# Patient Record
Sex: MALE | Race: WHITE | URBAN - METROPOLITAN AREA
[De-identification: names, ages, dates, MRNs, and addresses within clinical notes are randomized per-mention and may not be internally consistent; named-entity substitution may affect disease eponyms.]

---

## 2018-09-06 ENCOUNTER — RECORDS - HEALTHEAST (OUTPATIENT)
Dept: LAB | Facility: CLINIC | Age: 63
End: 2018-09-06

## 2018-09-06 LAB
ALBUMIN SERPL-MCNC: 4 G/DL (ref 3.5–5)
ALP SERPL-CCNC: 69 U/L (ref 45–120)
ALT SERPL W P-5'-P-CCNC: 61 U/L (ref 0–45)
ANION GAP SERPL CALCULATED.3IONS-SCNC: 8 MMOL/L (ref 5–18)
AST SERPL W P-5'-P-CCNC: 46 U/L (ref 0–40)
BILIRUB SERPL-MCNC: 1.1 MG/DL (ref 0–1)
BUN SERPL-MCNC: 13 MG/DL (ref 8–22)
CALCIUM SERPL-MCNC: 9.3 MG/DL (ref 8.5–10.5)
CHLORIDE BLD-SCNC: 107 MMOL/L (ref 98–107)
CHOLEST SERPL-MCNC: 201 MG/DL
CO2 SERPL-SCNC: 26 MMOL/L (ref 22–31)
CREAT SERPL-MCNC: 0.88 MG/DL (ref 0.7–1.3)
FASTING STATUS PATIENT QL REPORTED: ABNORMAL
GFR SERPL CREATININE-BSD FRML MDRD: >60 ML/MIN/1.73M2
GLUCOSE BLD-MCNC: 105 MG/DL (ref 70–125)
HDLC SERPL-MCNC: 31 MG/DL
LDLC SERPL CALC-MCNC: 120 MG/DL
POTASSIUM BLD-SCNC: 4.8 MMOL/L (ref 3.5–5)
PROT SERPL-MCNC: 7.1 G/DL (ref 6–8)
PSA SERPL-MCNC: 0.9 NG/ML (ref 0–4.5)
SODIUM SERPL-SCNC: 141 MMOL/L (ref 136–145)
TRIGL SERPL-MCNC: 249 MG/DL

## 2019-10-24 ENCOUNTER — RECORDS - HEALTHEAST (OUTPATIENT)
Dept: LAB | Facility: CLINIC | Age: 64
End: 2019-10-24

## 2019-10-24 LAB
ANION GAP SERPL CALCULATED.3IONS-SCNC: 8 MMOL/L (ref 5–18)
BUN SERPL-MCNC: 15 MG/DL (ref 8–22)
CALCIUM SERPL-MCNC: 9.4 MG/DL (ref 8.5–10.5)
CHLORIDE BLD-SCNC: 104 MMOL/L (ref 98–107)
CHOLEST SERPL-MCNC: 166 MG/DL
CO2 SERPL-SCNC: 27 MMOL/L (ref 22–31)
CREAT SERPL-MCNC: 1.11 MG/DL (ref 0.7–1.3)
FASTING STATUS PATIENT QL REPORTED: ABNORMAL
GFR SERPL CREATININE-BSD FRML MDRD: >60 ML/MIN/1.73M2
GLUCOSE BLD-MCNC: 203 MG/DL (ref 70–125)
HDLC SERPL-MCNC: 26 MG/DL
LDLC SERPL CALC-MCNC: 61 MG/DL
LDLC SERPL CALC-MCNC: ABNORMAL MG/DL
POTASSIUM BLD-SCNC: 4.7 MMOL/L (ref 3.5–5)
SODIUM SERPL-SCNC: 139 MMOL/L (ref 136–145)
TRIGL SERPL-MCNC: 644 MG/DL

## 2019-11-06 ENCOUNTER — RECORDS - HEALTHEAST (OUTPATIENT)
Dept: LAB | Facility: CLINIC | Age: 64
End: 2019-11-06

## 2019-11-06 LAB
CHOLEST SERPL-MCNC: 148 MG/DL
FASTING STATUS PATIENT QL REPORTED: YES
HDLC SERPL-MCNC: 26 MG/DL
LDLC SERPL CALC-MCNC: 63 MG/DL
TRIGL SERPL-MCNC: 294 MG/DL

## 2019-11-07 LAB — HBA1C MFR BLD: 8.6 % (ref 4.2–6.1)

## 2020-06-26 ENCOUNTER — RECORDS - HEALTHEAST (OUTPATIENT)
Dept: LAB | Facility: CLINIC | Age: 65
End: 2020-06-26

## 2020-06-26 LAB
ALBUMIN SERPL-MCNC: 3.7 G/DL (ref 3.5–5)
ALP SERPL-CCNC: 63 U/L (ref 45–120)
ALT SERPL W P-5'-P-CCNC: 59 U/L (ref 0–45)
ANION GAP SERPL CALCULATED.3IONS-SCNC: 11 MMOL/L (ref 5–18)
AST SERPL W P-5'-P-CCNC: 34 U/L (ref 0–40)
BILIRUB SERPL-MCNC: 0.6 MG/DL (ref 0–1)
BUN SERPL-MCNC: 13 MG/DL (ref 8–22)
CALCIUM SERPL-MCNC: 8.8 MG/DL (ref 8.5–10.5)
CHLORIDE BLD-SCNC: 102 MMOL/L (ref 98–107)
CHOLEST SERPL-MCNC: 96 MG/DL
CO2 SERPL-SCNC: 24 MMOL/L (ref 22–31)
CREAT SERPL-MCNC: 0.97 MG/DL (ref 0.7–1.3)
FASTING STATUS PATIENT QL REPORTED: NO
GFR SERPL CREATININE-BSD FRML MDRD: >60 ML/MIN/1.73M2
GLUCOSE BLD-MCNC: 211 MG/DL (ref 70–125)
HDLC SERPL-MCNC: 25 MG/DL
LDLC SERPL CALC-MCNC: 27 MG/DL
POTASSIUM BLD-SCNC: 3.8 MMOL/L (ref 3.5–5)
PROT SERPL-MCNC: 7 G/DL (ref 6–8)
SODIUM SERPL-SCNC: 137 MMOL/L (ref 136–145)
TRIGL SERPL-MCNC: 218 MG/DL

## 2020-12-18 ENCOUNTER — RECORDS - HEALTHEAST (OUTPATIENT)
Dept: LAB | Facility: CLINIC | Age: 65
End: 2020-12-18

## 2020-12-18 LAB
ALBUMIN SERPL-MCNC: 4.1 G/DL (ref 3.5–5)
ALP SERPL-CCNC: 67 U/L (ref 45–120)
ALT SERPL W P-5'-P-CCNC: 68 U/L (ref 0–45)
ANION GAP SERPL CALCULATED.3IONS-SCNC: 8 MMOL/L (ref 5–18)
AST SERPL W P-5'-P-CCNC: 33 U/L (ref 0–40)
BILIRUB SERPL-MCNC: 1 MG/DL (ref 0–1)
BUN SERPL-MCNC: 18 MG/DL (ref 8–22)
CALCIUM SERPL-MCNC: 9.2 MG/DL (ref 8.5–10.5)
CHLORIDE BLD-SCNC: 97 MMOL/L (ref 98–107)
CO2 SERPL-SCNC: 25 MMOL/L (ref 22–31)
CREAT SERPL-MCNC: 1.06 MG/DL (ref 0.7–1.3)
GFR SERPL CREATININE-BSD FRML MDRD: >60 ML/MIN/1.73M2
GLUCOSE BLD-MCNC: 196 MG/DL (ref 70–125)
POTASSIUM BLD-SCNC: 4.1 MMOL/L (ref 3.5–5)
PROT SERPL-MCNC: 7.3 G/DL (ref 6–8)
SODIUM SERPL-SCNC: 130 MMOL/L (ref 136–145)

## 2021-01-06 ENCOUNTER — RECORDS - HEALTHEAST (OUTPATIENT)
Dept: LAB | Facility: CLINIC | Age: 66
End: 2021-01-06

## 2021-01-06 LAB
SARS-COV-2 PCR COMMENT: NORMAL
SARS-COV-2 RNA SPEC QL NAA+PROBE: NEGATIVE
SARS-COV-2 VIRUS SPECIMEN SOURCE: NORMAL

## 2021-08-03 ENCOUNTER — LAB REQUISITION (OUTPATIENT)
Dept: LAB | Facility: CLINIC | Age: 66
End: 2021-08-03
Payer: COMMERCIAL

## 2021-08-03 DIAGNOSIS — Z01.818 ENCOUNTER FOR OTHER PREPROCEDURAL EXAMINATION: ICD-10-CM

## 2021-08-03 DIAGNOSIS — Z01.812 ENCOUNTER FOR PREPROCEDURAL LABORATORY EXAMINATION: ICD-10-CM

## 2021-08-03 DIAGNOSIS — E11.9 TYPE 2 DIABETES MELLITUS WITHOUT COMPLICATIONS (H): ICD-10-CM

## 2021-08-03 LAB
ALBUMIN SERPL-MCNC: 4.1 G/DL (ref 3.5–5)
ALP SERPL-CCNC: 88 U/L (ref 45–120)
ALT SERPL W P-5'-P-CCNC: 68 U/L (ref 0–45)
ANION GAP SERPL CALCULATED.3IONS-SCNC: 12 MMOL/L (ref 5–18)
AST SERPL W P-5'-P-CCNC: 31 U/L (ref 0–40)
BILIRUB SERPL-MCNC: 1.2 MG/DL (ref 0–1)
BUN SERPL-MCNC: 11 MG/DL (ref 8–22)
CALCIUM SERPL-MCNC: 9.8 MG/DL (ref 8.5–10.5)
CHLORIDE BLD-SCNC: 98 MMOL/L (ref 98–107)
CHOLEST SERPL-MCNC: 118 MG/DL
CO2 SERPL-SCNC: 25 MMOL/L (ref 22–31)
CREAT SERPL-MCNC: 1.41 MG/DL (ref 0.7–1.3)
FASTING STATUS PATIENT QL REPORTED: ABNORMAL
GFR SERPL CREATININE-BSD FRML MDRD: 52 ML/MIN/1.73M2
GLUCOSE BLD-MCNC: 516 MG/DL (ref 70–125)
HDLC SERPL-MCNC: 27 MG/DL
LDLC SERPL CALC-MCNC: 26 MG/DL
POTASSIUM BLD-SCNC: 5.2 MMOL/L (ref 3.5–5)
PROT SERPL-MCNC: 7.4 G/DL (ref 6–8)
SODIUM SERPL-SCNC: 135 MMOL/L (ref 136–145)
TRIGL SERPL-MCNC: 324 MG/DL

## 2021-08-03 PROCEDURE — 80053 COMPREHEN METABOLIC PANEL: CPT | Performed by: PHYSICIAN ASSISTANT

## 2021-08-03 PROCEDURE — U0005 INFEC AGEN DETEC AMPLI PROBE: HCPCS | Mod: ORL | Performed by: PHYSICIAN ASSISTANT

## 2021-08-03 PROCEDURE — 80061 LIPID PANEL: CPT | Mod: ORL | Performed by: PHYSICIAN ASSISTANT

## 2021-08-04 ENCOUNTER — OFFICE VISIT (OUTPATIENT)
Dept: PHARMACY | Facility: PHYSICIAN GROUP | Age: 66
End: 2021-08-04
Payer: COMMERCIAL

## 2021-08-04 DIAGNOSIS — E11.65 TYPE 2 DIABETES MELLITUS WITH HYPERGLYCEMIA, WITHOUT LONG-TERM CURRENT USE OF INSULIN (H): Primary | ICD-10-CM

## 2021-08-04 DIAGNOSIS — E78.2 MIXED HYPERLIPIDEMIA: ICD-10-CM

## 2021-08-04 DIAGNOSIS — I10 ESSENTIAL HYPERTENSION: ICD-10-CM

## 2021-08-04 LAB — SARS-COV-2 RNA RESP QL NAA+PROBE: NEGATIVE

## 2021-08-04 PROCEDURE — 99605 MTMS BY PHARM NP 15 MIN: CPT | Performed by: PHARMACIST

## 2021-08-04 PROCEDURE — 99607 MTMS BY PHARM ADDL 15 MIN: CPT | Performed by: PHARMACIST

## 2021-08-04 ASSESSMENT — MIFFLIN-ST. JEOR: SCORE: 1495.69

## 2021-08-09 VITALS
BODY MASS INDEX: 23.52 KG/M2 | WEIGHT: 158.8 LBS | HEART RATE: 62 BPM | HEIGHT: 69 IN | DIASTOLIC BLOOD PRESSURE: 80 MMHG | SYSTOLIC BLOOD PRESSURE: 138 MMHG

## 2021-08-09 RX ORDER — AMLODIPINE BESYLATE 10 MG/1
10 TABLET ORAL DAILY
COMMUNITY

## 2021-08-09 RX ORDER — GLIMEPIRIDE 2 MG/1
2 TABLET ORAL
COMMUNITY

## 2021-08-09 RX ORDER — METOPROLOL SUCCINATE 100 MG/1
100 TABLET, EXTENDED RELEASE ORAL DAILY
COMMUNITY

## 2021-08-09 RX ORDER — ATORVASTATIN CALCIUM 20 MG/1
20 TABLET, FILM COATED ORAL DAILY
COMMUNITY

## 2021-08-09 NOTE — PROGRESS NOTES
Medication Therapy Management (MTM) Encounter    ASSESSMENT:                            Medication Adherence/Access: Patient would benefit from ongoing MTM follow-up to help with medication education and adherence.    Type 2 diabetes: Patient is not meeting A1c goal of < 7%. Self-monitoring blood sugar is unknown and he would benefit starting continuous glucose monitor to help better understand home blood sugar numbers. He would also benefit from starting new glimepiride medicine ordered yesterday. Reasonable to start with encouraging adherence to increased metformin dose and glimepiride until better able to understand home blood sugar readings. He may actually benefit from considering a weekly GLP-1 agonist since he has had trouble with daily oral medications in the past. He would be eligible to use co-pay savings card with his insurance to help with cost. Aspirin therapy is indicated in this patient at dose of 81mg daily. He would benefit from starting low-dose aspirin therapy but it is reasonable to differ discussion to follow-up.   Due for microalbumin level. Due for eye exam.   Education provided today on:   Diabetes Pathophysiology  Healthy Eating: portion control and reducing high sugar intake  Monitoring: purpose, individual blood glucose targets and frequency of monitoring  Taking Medication: action of prescribed medication, side effects of prescribed medications and when to take medications    CGM-specific education:   Freestyle Randolph 2 sensor: insertion technique, sensor site location and rotation, sensor wear, reasons to remove sensor (MRI, CT, diathermy), Vitamin C & Aspirin effects on sensor  Randolph Van Orin: frequency of scanning sensor, length of time data is visible, use of built in glucose meter, Precision X-tra test strips and Use of trends and graphs for pattern management and problem solving     Patient verbalized understanding of concepts discussed and recommendations provided today.  Patient  already self-inserted Randolph 2 sensor prior to visit without visible issues. Concerned error message he previously received may be due to blood sugar above 500, further follow-up needed once sensor is active.     Hypertension: Patient is not meeting blood pressure goal of < 130/80mmHg. Rationale for using metoprolol succinate is unclear. He would benefit from checking microalbumin level and if elevated consider stopping metoprolol and starting losartan.     Hyperlipidemia: Stable. Patient is on moderate intensity statin which is indicated based on 2019 ACC/AHA guidelines for lipid management.      PLAN:                            1. Continue metformin two 500 mg tablets twice a day and start new glimepiride 2 mg once a day in the morning    2. Start using Freestyle Randolph 2 continuous monitor to check blood sugar.     The first hour after you place the Freestyle Randolph 2 sensor is the warm up period (black out period). You can start scanning your sensor to check your blood sugar after the warm up period.     Scan sensor to check blood sugar at least once every 8 hours to ensure entirety of data is available.     Change sensor every 14 days.    Watch trend arrows- will let you know if blood sugars are rising, falling or stable at the time you check.    It is okay to shower, bathe, and swim (up to 3 feet deep for 30 minutes) with sensor. Do not get reader wet. Remove the sensor if you need to have an MRI or CT scan.    Future considerations: may consider weekly Trulicity or Ozempic if blood sugar continues to be high to reduce daily pill burden    Follow-up: Return in about 1 week (around 8/11/2021) for diabetes medication management follow-up by phone.      SUBJECTIVE/OBJECTIVE:                          Jeramie Michelle is a 65 year old male coming in for an initial visit. He was referred to me from Sunday Brown PA-C.      Reason for visit: Diabetes medication management and new Randolph continuous glucose monitor  start.    Allergies/ADRs: lisinopril-cough  Past Medical History: Reviewed in chart  Tobacco: He reports that he has never smoked. He has never used smokeless tobacco.  Alcohol: 1 or 2 drinks two to four times a month    Medication Adherence/Access: Patient takes medication two times a day, but admits he is better at taking once a day. He has not needed to take a lot of medications and prefers to keep regimen as simple as possible. He does miss doses occasionally. He has been overwhelmed with medications in the past resulting in him stopping completely.     Type 2 Diabetes:   Diabetes Medication(s)     Biguanides       metFORMIN (GLUCOPHAGE) 500 MG tablet    Take 1,000 mg by mouth 2 times daily (with meals)    Sulfonylureas       glimepiride (AMARYL) 2 MG tablet    Take 2 mg by mouth every morning (before breakfast)        He was in clinic to see provider for pre-op visit yesterday and found to have A1c of 10.9% and blood glucose in 500's. He was surprised at how high his blood sugar was and admits he hasn't been eating as well as he should be. He has been losing weight (about 10 lbs in past year) so he thought that would help. He had been taking metformin 1000 mg a day. Sunday increased his dose to two 500 mg tablets twice a day and started glimepiride 2 mg every morning. He hasn't picked up and started glimepiride yet. He has not had any side effects with metformin in the past.     He comes into clinic today for help learning to use Freestyle Randolph 2 continuous glucose monitor. He has already put on the Randolph sensor but was getting a message he isn't able to check for 60 minutes then after 60 minutes it gave him an error message. He has not been checking his blood sugar at home prior to this. He thinks having a easy way to check his blood sugar during the day will be helpful for him.     Diet/Exercise: he admits to eating a lot of candy, also drinks soda. Active during the day at work.   Symptoms of low blood sugar?  "none  Symptoms of high blood sugar? polydipsia, polyuria and fatigue, weight loss  Eye exam: due; Foot exam: up to date  Aspirin: Not taking, not discussed today  Statin: Yes: atorvastatin 20 mg once daily   ACEi/ARB: No. History of cough with lisinopril, history of ARB unknown.    Lab Results   Component Value Date    A1C 10.9% 08/03/2021    A1C 8.5% 12/18/2020    A1C 8.4% 09/25/2020       Glucose   Date Value Ref Range Status   08/03/2021 516 (HH) 70 - 125 mg/dL Final           Hypertension:   Current medications: amlodipine 10 mg once daily, metoprolol succinate 100 mg once daily    Patient does not self-monitor blood pressure.  Patient reports no current medication side effects. He does feel tired during the day but isn't sure if it is medications causing it. He was on lisinopril in the past and stopped this due to cough.     Hyperlipidemia:   Current therapy includes atorvastatin 20 mg daily.    Patient reports no significant myalgias or other side effects.    Recent Labs   Lab Test 08/03/21  1608 06/26/20  1645   CHOL 118 96   HDL 27* 25*   LDL 26 27   TRIG 324* 218*         Last Clinic Vitals: 8/3/2021- /80 (BP Location: Left arm, Patient Position: Sitting, Cuff Size: Adult Regular)   Pulse 62   Ht 5' 9\" (1.753 m)   Wt 158 lb 12.8 oz (72 kg)   BMI 23.45 kg/m    ----------------    I spent 35 minutes with this patient today. I offer these suggestions for consideration by Sunday Rebolledo PA-C. A copy of the visit note was provided to the patient's primary care provider.    The patient was given a summary of these recommendations.     Radha Sotomayor, PharmD, BCACP  Medication Therapy Management Pharmacist  [facility]  Pager: [phone]         Medication Therapy Recommendations  Type 2 diabetes mellitus with hyperglycemia, without long-term current use of insulin (H)    Current Medication: Continuous Blood Gluc Sensor (FREESTYLE JUAN 2 SENSOR) MISC   Rationale: Does not understand " instructions - Adherence - Adherence   Recommendation: Provide Education - Freestyle Randolph 2 education provided in clinic today   Status: Patient Agreed - Adherence/Education

## 2021-08-09 NOTE — PATIENT INSTRUCTIONS
Recommendations from today's MTM visit:                                                    MTM (medication therapy management) is a service provided by a clinical pharmacist designed to help you get the most of out of your medicines.   Today we reviewed what your medicines are for, how to know if they are working, that your medicines are safe and how to make your medicine regimen as easy as possible.      1. Continue metformin two 500 mg tablets twice a day and start new glimepiride 2 mg once a day in the morning    2. Start using Freestyle Randolph 2 continuous monitor to check blood sugar.     The first hour after you place the Freestyle Randolph 2 sensor is the warm up period (black out period). You can start scanning your sensor to check your blood sugar after the warm up period.     Scan sensor to check blood sugar at least once every 8 hours to ensure entirety of data is available.     Change sensor every 14 days.    Watch trend arrows- will let you know if blood sugars are rising, falling or stable at the time you check.    It is okay to shower, bathe, and swim (up to 3 feet deep for 30 minutes) with sensor. Do not get reader wet. Remove the sensor if you need to have an MRI or CT scan.      Follow-up: Return in about 1 week (around 8/11/2021) for diabetes medication management follow-up by phone.      It was great to speak with you today.  I value your experience and would be very thankful for your time with providing feedback on our clinic survey. You may receive a survey via email or text message in the next few days.     To schedule another MTM appointment, please call the clinic directly or you may call the MTM scheduling line at [phone] or toll-free at [phone].     My Clinical Pharmacist's contact information:                                                      Please feel free to contact me with any questions or concerns you have.      Radha Sotomayor, PharmD, BCACP  Medication Therapy  Management Pharmacist  [facility]  Pager: [phone]

## 2021-08-20 ENCOUNTER — LAB REQUISITION (OUTPATIENT)
Dept: LAB | Facility: CLINIC | Age: 66
End: 2021-08-20
Payer: COMMERCIAL

## 2021-08-20 DIAGNOSIS — E11.9 TYPE 2 DIABETES MELLITUS WITHOUT COMPLICATIONS (H): ICD-10-CM

## 2021-08-20 LAB
CREAT UR-MCNC: 204 MG/DL
MICROALBUMIN UR-MCNC: 3.84 MG/DL (ref 0–1.99)
MICROALBUMIN/CREAT UR: 18.8 MG/G CR

## 2021-08-20 PROCEDURE — 82043 UR ALBUMIN QUANTITATIVE: CPT | Mod: ORL | Performed by: PHYSICIAN ASSISTANT

## 2021-09-28 ENCOUNTER — TRANSFERRED RECORDS (OUTPATIENT)
Dept: HEALTH INFORMATION MANAGEMENT | Facility: CLINIC | Age: 66
End: 2021-09-28
Payer: COMMERCIAL

## 2021-09-28 ENCOUNTER — LAB REQUISITION (OUTPATIENT)
Dept: LAB | Facility: CLINIC | Age: 66
End: 2021-09-28
Payer: COMMERCIAL

## 2021-09-28 DIAGNOSIS — Z01.818 ENCOUNTER FOR OTHER PREPROCEDURAL EXAMINATION: ICD-10-CM

## 2021-09-28 LAB — HBA1C MFR BLD: 7 % (ref 4.2–6.1)

## 2021-09-28 PROCEDURE — U0005 INFEC AGEN DETEC AMPLI PROBE: HCPCS | Mod: ORL | Performed by: PHYSICIAN ASSISTANT

## 2021-09-29 LAB — SARS-COV-2 RNA RESP QL NAA+PROBE: NEGATIVE

## 2022-10-18 ENCOUNTER — LAB REQUISITION (OUTPATIENT)
Dept: LAB | Facility: CLINIC | Age: 67
End: 2022-10-18

## 2022-10-18 DIAGNOSIS — I10 ESSENTIAL (PRIMARY) HYPERTENSION: ICD-10-CM

## 2022-10-18 LAB
CHOLEST SERPL-MCNC: 177 MG/DL
HDLC SERPL-MCNC: 29 MG/DL
LDLC SERPL CALC-MCNC: 80 MG/DL
NONHDLC SERPL-MCNC: 148 MG/DL
TRIGL SERPL-MCNC: 339 MG/DL

## 2022-10-18 PROCEDURE — 80053 COMPREHEN METABOLIC PANEL: CPT | Performed by: PHYSICIAN ASSISTANT

## 2022-10-18 PROCEDURE — 80061 LIPID PANEL: CPT | Performed by: PHYSICIAN ASSISTANT

## 2022-10-19 LAB
ALBUMIN SERPL BCG-MCNC: 4.4 G/DL (ref 3.5–5.2)
ALP SERPL-CCNC: 71 U/L (ref 40–129)
ALT SERPL W P-5'-P-CCNC: 78 U/L (ref 10–50)
ANION GAP SERPL CALCULATED.3IONS-SCNC: 13 MMOL/L (ref 7–15)
AST SERPL W P-5'-P-CCNC: 43 U/L (ref 10–50)
BILIRUB SERPL-MCNC: 1.2 MG/DL
BUN SERPL-MCNC: 20 MG/DL (ref 8–23)
CALCIUM SERPL-MCNC: 9.1 MG/DL (ref 8.8–10.2)
CHLORIDE SERPL-SCNC: 100 MMOL/L (ref 98–107)
CREAT SERPL-MCNC: 1.03 MG/DL (ref 0.67–1.17)
DEPRECATED HCO3 PLAS-SCNC: 24 MMOL/L (ref 22–29)
GFR SERPL CREATININE-BSD FRML MDRD: 80 ML/MIN/1.73M2
GLUCOSE SERPL-MCNC: 122 MG/DL (ref 70–99)
POTASSIUM SERPL-SCNC: 4.3 MMOL/L (ref 3.4–5.3)
PROT SERPL-MCNC: 7 G/DL (ref 6.4–8.3)
SODIUM SERPL-SCNC: 137 MMOL/L (ref 136–145)